# Patient Record
Sex: FEMALE | Race: WHITE | NOT HISPANIC OR LATINO | Employment: FULL TIME | URBAN - METROPOLITAN AREA
[De-identification: names, ages, dates, MRNs, and addresses within clinical notes are randomized per-mention and may not be internally consistent; named-entity substitution may affect disease eponyms.]

---

## 2018-06-19 ENCOUNTER — NURSE TRIAGE (OUTPATIENT)
Dept: NURSING | Facility: CLINIC | Age: 38
End: 2018-06-19

## 2018-06-19 NOTE — TELEPHONE ENCOUNTER
Reason for Disposition    [1] MODERATE leg swelling (e.g., swelling extends up to knees) AND [2] new onset or worsening    Additional Information    Negative: Severe difficulty breathing (e.g., struggling for each breath, speaks in single words)    Negative: Looks like a broken bone or dislocated joint (e.g., crooked or deformed)    Negative: Sounds like a life-threatening emergency to the triager    Negative: Difficulty breathing at rest    Negative: Entire foot is cool or blue in comparison to other side    Negative: [1] Can't walk or can barely walk AND [2] new onset    Negative: [1] Difficulty breathing with exertion (e.g., walking) AND [2] new onset or worsening    Negative: [1] Red area or streak AND [2] fever    Negative: [1] Swelling is painful to touch AND [2] fever    Negative: [1] Cast on leg or ankle AND [2] now increased pain    Negative: Patient sounds very sick or weak to the triager    Negative: SEVERE leg swelling (e.g., swelling extends above knee, entire leg is swollen, weeping fluid)    Negative: [1] Red area or streak [2] large (> 2 in. or 5 cm)    Negative: [1] Thigh or calf pain AND [2] only 1 side AND [3] present > 1 hour    Negative: [1] Thigh, calf, or ankle swelling AND [2] only 1 side    Negative: [1] Thigh, calf, or ankle swelling AND [2] bilateral AND [3] 1 side is more swollen    Protocols used: LEG SWELLING AND EDEMA-ADULT-

## 2018-06-19 NOTE — TELEPHONE ENCOUNTER
For several weeks Mariam is having left heel pain.  Mariam is calling to schedule with [facility].

## 2021-05-30 ENCOUNTER — HEALTH MAINTENANCE LETTER (OUTPATIENT)
Age: 41
End: 2021-05-30

## 2021-09-19 ENCOUNTER — HEALTH MAINTENANCE LETTER (OUTPATIENT)
Age: 41
End: 2021-09-19

## 2021-12-11 ENCOUNTER — IMMUNIZATION (OUTPATIENT)
Dept: NURSING | Facility: CLINIC | Age: 41
End: 2021-12-11
Payer: COMMERCIAL

## 2021-12-11 PROCEDURE — 0004A PR COVID VAC PFIZER DIL RECON 30 MCG/0.3 ML IM: CPT

## 2021-12-11 PROCEDURE — 91300 PR COVID VAC PFIZER DIL RECON 30 MCG/0.3 ML IM: CPT

## 2022-06-25 ENCOUNTER — HEALTH MAINTENANCE LETTER (OUTPATIENT)
Age: 42
End: 2022-06-25

## 2022-11-20 ENCOUNTER — HEALTH MAINTENANCE LETTER (OUTPATIENT)
Age: 42
End: 2022-11-20

## 2023-11-25 ENCOUNTER — HEALTH MAINTENANCE LETTER (OUTPATIENT)
Age: 43
End: 2023-11-25

## 2024-02-03 ENCOUNTER — HEALTH MAINTENANCE LETTER (OUTPATIENT)
Age: 44
End: 2024-02-03